# Patient Record
Sex: FEMALE | Race: WHITE
[De-identification: names, ages, dates, MRNs, and addresses within clinical notes are randomized per-mention and may not be internally consistent; named-entity substitution may affect disease eponyms.]

---

## 2017-08-07 ENCOUNTER — HOSPITAL ENCOUNTER (OUTPATIENT)
Dept: HOSPITAL 53 - M LAB REF | Age: 62
End: 2017-08-07
Attending: INTERNAL MEDICINE
Payer: COMMERCIAL

## 2017-08-07 DIAGNOSIS — M17.2: Primary | ICD-10-CM

## 2017-08-07 LAB
BF DIFF IF INDICATED?: YES
CC BF DIFF EXAM: (no result)
COLOR SNV: YELLOW
CRYSTALS FLD MICRO: (no result)
RBC ADVIA BF: 0
RBC CALC. BF: (no result)
WBC ADVIA BF: 4.2
WBC CALC. BF: 4200 CELLS/UL (ref 0–20)

## 2017-11-09 ENCOUNTER — HOSPITAL ENCOUNTER (OUTPATIENT)
Dept: HOSPITAL 53 - M OPP | Age: 62
Discharge: HOME | End: 2017-11-09
Attending: SURGERY
Payer: COMMERCIAL

## 2017-11-09 VITALS — HEIGHT: 65 IN | BODY MASS INDEX: 21.63 KG/M2 | WEIGHT: 129.8 LBS

## 2017-11-09 VITALS — DIASTOLIC BLOOD PRESSURE: 57 MMHG | SYSTOLIC BLOOD PRESSURE: 106 MMHG

## 2017-11-09 DIAGNOSIS — M06.9: ICD-10-CM

## 2017-11-09 DIAGNOSIS — Z79.51: ICD-10-CM

## 2017-11-09 DIAGNOSIS — D12.2: ICD-10-CM

## 2017-11-09 DIAGNOSIS — J47.9: ICD-10-CM

## 2017-11-09 DIAGNOSIS — K57.30: ICD-10-CM

## 2017-11-09 DIAGNOSIS — Z88.8: ICD-10-CM

## 2017-11-09 DIAGNOSIS — I10: ICD-10-CM

## 2017-11-09 DIAGNOSIS — Z88.2: ICD-10-CM

## 2017-11-09 DIAGNOSIS — K64.8: ICD-10-CM

## 2017-11-09 DIAGNOSIS — Z88.1: ICD-10-CM

## 2017-11-09 DIAGNOSIS — Z12.11: Primary | ICD-10-CM

## 2017-11-09 DIAGNOSIS — K21.9: ICD-10-CM

## 2017-11-09 DIAGNOSIS — Z91.041: ICD-10-CM

## 2017-11-09 DIAGNOSIS — M81.0: ICD-10-CM

## 2017-11-09 DIAGNOSIS — B39.9: ICD-10-CM

## 2017-11-09 DIAGNOSIS — Z79.899: ICD-10-CM

## 2017-11-09 NOTE — ROOR
________________________________________________________________________________

Patient Name: Isa Hughes        Procedure Date: 11/9/2017 8:00 AM

MRN: [id number]                          Account Number: [id number]

YOB: 1955              Age: 61

Room: [facility]                            Gender: Female

Note Status: Finalized                 

________________________________________________________________________________

 

Procedure:           Colonoscopy

Indications:         Screening for colorectal malignant neoplasm

Providers:           Leo J. Gosselin Jr, MD

Referring MD:        PHYLICIA VASQUEZ MD

Requesting Provider: 

Medicines:           Propofol per Anesthesia

Complications:       No immediate complications.

________________________________________________________________________________

Procedure:           Pre-Anesthesia Assessment:

                     - Prior to the procedure, a History and Physical was 

                     performed, and patient medications and allergies were 

                     reviewed. The patient is competent. The risks and 

                     benefits of the procedure and the sedation options and 

                     risks were discussed with the patient. All questions were 

                     answered and informed consent was obtained. Patient 

                     identification and proposed procedure were verified by 

                     the physician and the nurse in the pre-procedure area and 

                     in the procedure room. Mental Status Examination: alert 

                     and oriented. Airway Examination: normal oropharyngeal 

                     airway and neck mobility. Respiratory Examination: clear 

                     to auscultation. CV Examination: normal. ASA Grade 

                     Assessment: II - A patient with mild systemic disease. 

                     After reviewing the risks and benefits, the patient was 

                     deemed in satisfactory condition to undergo the 

                     procedure. The anesthesia plan was to use moderate 

                     sedation / analgesia (conscious sedation). Immediately 

                     prior to administration of medications, the patient was 

                     re-assessed for adequacy to receive sedatives. The heart 

                     rate, respiratory rate, oxygen saturations, blood 

                     pressure, adequacy of pulmonary ventilation, and response 

                     to care were monitored throughout the procedure. The 

                     physical status of the patient was re-assessed after the 

                     procedure.

                     The Colonoscope was introduced through the anus and 

                     advanced to the cecum, identified by appendiceal orifice 

                     and ileocecal valve. The colonoscopy was performed 

                     without difficulty. The patient tolerated the procedure 

                     well. The quality of the bowel preparation was adequate 

                     and good.

                                                                                

Findings:

     Multiple small and large-mouthed diverticula were found in the sigmoid 

     colon.

     Three polyps were found in the ascending colon. The polyps were 

     diminutive in size. These polyps were removed with a hot snare. Resection 

     and retrieval were complete.

     The rectum, descending colon, transverse colon, cecum, appendiceal 

     orifice and ileocecal valve appeared normal.

     Non-bleeding external and internal hemorrhoids were found during 

     endoscopy. The hemorrhoids were medium-sized.

                                                                                

Impression:          - Diverticulosis in the sigmoid colon.

                     - Three diminutive polyps in the ascending colon, removed 

                     with a hot snare. Resected and retrieved.

                     - The rectum, descending colon, transverse colon, cecum, 

                     appendiceal orifice and ileocecal valve are normal.

                     - Non-bleeding external and internal hemorrhoids.

Recommendation:      - Repeat colonoscopy in 5-10 years for surveillance based 

                     on pathology results.

                                                                                

 

Leo Gosselin MD

_____________________

Leo J Gosselin Jr, MD

11/9/2017 8:31:15 AM

This report has been signed electronically.

Number of Addenda: 0

 

Note Initiated On: 11/9/2017 8:00 AM

Estimated Blood Loss:

     Estimated blood loss: none.

## 2017-11-28 ENCOUNTER — HOSPITAL ENCOUNTER (OUTPATIENT)
Dept: HOSPITAL 53 - M WHC | Age: 62
End: 2017-11-28
Attending: NURSE PRACTITIONER
Payer: COMMERCIAL

## 2017-11-28 DIAGNOSIS — Z12.31: Primary | ICD-10-CM

## 2017-11-28 DIAGNOSIS — Z78.0: ICD-10-CM

## 2018-01-18 ENCOUNTER — HOSPITAL ENCOUNTER (OUTPATIENT)
Dept: HOSPITAL 53 - M LAB REF | Age: 63
End: 2018-01-18
Attending: NURSE PRACTITIONER
Payer: COMMERCIAL

## 2018-01-18 DIAGNOSIS — N32.81: Primary | ICD-10-CM

## 2018-01-18 LAB
APPEARANCE, URINE: CLEAR
BACTERIA UR QL AUTO: NEGATIVE
BILIRUBIN, URINE AUTO: NEGATIVE
BLOOD, URINE BLOOD: NEGATIVE
GLUCOSE, URINE (UA) AUTO: NEGATIVE MG/DL
KETONE, URINE AUTO: NEGATIVE MG/DL
LEUKOCYTE ESTERASE UR QL STRIP.AUTO: NEGATIVE
MUCUS, URINE: (no result)
NITRITE, URINE AUTO: NEGATIVE
PH,URINE: 5 UNITS (ref 5–9)
PROT UR QL STRIP.AUTO: NEGATIVE MG/DL
RBC, URINE AUTO: 2 /HPF (ref 0–3)
SPECIFIC GRAVITY URINE AUTO: 1.02 (ref 1–1.03)
SQUAMOUS #/AREA URNS AUTO: 0 /HPF (ref 0–6)
UROBILINOGEN, URINE AUTO: 0.2 MG/DL (ref 0–2)
WBC, URINE AUTO: 1 /HPF (ref 0–3)

## 2018-01-18 PROCEDURE — 81001 URINALYSIS AUTO W/SCOPE: CPT

## 2018-11-29 ENCOUNTER — HOSPITAL ENCOUNTER (OUTPATIENT)
Dept: HOSPITAL 53 - M WHC | Age: 63
End: 2018-11-29
Attending: NURSE PRACTITIONER
Payer: COMMERCIAL

## 2018-11-29 DIAGNOSIS — Z92.21: ICD-10-CM

## 2018-11-29 DIAGNOSIS — Z80.3: ICD-10-CM

## 2018-11-29 DIAGNOSIS — Z12.31: Primary | ICD-10-CM

## 2018-11-29 DIAGNOSIS — Z78.0: ICD-10-CM

## 2018-11-29 DIAGNOSIS — Z92.23: ICD-10-CM

## 2018-11-29 DIAGNOSIS — R92.1: ICD-10-CM

## 2018-11-29 DIAGNOSIS — Z80.42: ICD-10-CM

## 2018-11-29 PROCEDURE — 77067 SCR MAMMO BI INCL CAD: CPT

## 2020-02-26 ENCOUNTER — HOSPITAL ENCOUNTER (OUTPATIENT)
Dept: HOSPITAL 53 - M WHC | Age: 65
End: 2020-02-26
Attending: UROLOGY
Payer: MEDICARE

## 2020-02-26 DIAGNOSIS — Z12.31: Primary | ICD-10-CM

## 2020-02-26 NOTE — REP
BILATERAL SCREENING DIGITAL MAMMOGRAM WITH 3D TOMOSYNTHESIS:

 

There are no palpable abnormalities or other breast complaints.

The the patient states she had a clinical breast examination January, 2020.

The Tyrer-Cuzick Lifetime Breast Cancer Risk Score is: 16.1% .

 

Comparison is 05/07/2014.

 

There are scattered areas of fibroglandular density.

There is no dominant mass, micro calcific cluster or architectural distortion

that would indicate malignancy.

There are no additional findings on 3D tomosynthesiss.

There is no change from the prior study.

 

Impression:

BIRADS/ACR category 1 mammogram.  Negative.

 

Recommendation:

Routine annual screening mammography.

 

This mammogram was interpreted with the aid of a FDA approved computer-aided

detection system.

 

A. Negative mammogram reports should not delay biopsy if a dominant or clinically

suspicious mass is present.

B. Not all breast cancers are identified by mammography or tomosynthesis.

C.  Adenosis and dense breasts may obscure an underlying neoplasm.

 

Patient letter M1.

 

 

Electronically Signed by

Yon Hernandez MD 02/26/2020 12:32 P

## 2020-06-25 ENCOUNTER — HOSPITAL ENCOUNTER (OUTPATIENT)
Dept: HOSPITAL 53 - M LAB REF | Age: 65
End: 2020-06-25
Attending: INTERNAL MEDICINE
Payer: MEDICARE

## 2020-06-25 DIAGNOSIS — J47.9: Primary | ICD-10-CM

## 2025-03-31 ENCOUNTER — HOSPITAL ENCOUNTER (OUTPATIENT)
Dept: HOSPITAL 53 - M SFHCWAGY | Age: 70
End: 2025-03-31
Attending: GENERAL PRACTICE
Payer: MEDICARE

## 2025-03-31 DIAGNOSIS — Z01.419: Primary | ICD-10-CM

## 2025-03-31 DIAGNOSIS — Z77.9: ICD-10-CM

## 2025-03-31 DIAGNOSIS — Z11.51: ICD-10-CM

## 2025-03-31 PROCEDURE — 87624 HPV HI-RISK TYP POOLED RSLT: CPT
